# Patient Record
(demographics unavailable — no encounter records)

---

## 2025-02-19 NOTE — ADDENDUM
[FreeTextEntry1] : This note was written by Vivek Downs on 02/19/2025 acting as scribe for Dr. Jorge A Gomez M.D.  I, Jorge A Gomez MD, have read and attest that all the information, medical decision making and discharge instructions within are true and accurate.

## 2025-02-19 NOTE — HISTORY OF PRESENT ILLNESS
[Improving] : improving [de-identified] : 64 year-old male presents for follow up evaluation. LHD.  S/P C3-T1 fusion on 5/16/23 with Dr. Gentile. He had right hand weakness last visit, cannot fully make a fist. Has been exercising with a . 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  Overall, his right hand is getting better. He has seen a hand specialist and neurologists for this as well. He is currently undergoing hand therapy as well since July 2023, has not noticed much improvement.  His neck pain has improved.  He states he has been seeing a hand specialist Dr. Sidney Peralta at [facility].  No fever chills sweats nausea vomiting no bowel or bladder dysfunction, no recent weight loss or gain no night pain. This history is in addition to the intake form that I personally reviewed.

## 2025-02-19 NOTE — DISCUSSION/SUMMARY
[de-identified] : S/P C3-T1 fusion on 5/16/23 with Dr. Gentile. Working out. Doing better. Discussed all options. F/U 1 year. He was able to go skiing in [place].  He is also working with a . All options discussed including rest, medicine, home exercise, acupuncture, Chiropractic care, Physical Therapy, Pain management, and last resort surgery. All questions were answered, all alternatives discussed, and the patient is in complete agreement with the treatment plan which the patient contributed to and discussed with me through the shared decision-making process. Follow-up appointment as instructed. Any issues and the patient will call or come in sooner.

## 2025-02-19 NOTE — PHYSICAL EXAM
[Normal] : Gait: normal [Senior's Sign] : negative Senior's sign [Pronator Drift] : negative pronator drift [SLR] : negative straight leg raise [de-identified] : 3 out of 5 in finger extension of the middle and fourth as well as the fifth digit of the right hand.  The index and fourth digit are stronger than the fifth digit.  Overall better than prior visit.   Hand strength is improving. 5 out of 5 motor strength, sensation is intact and symmetrical full range of motion flexion extension and rotation, no palpatory tenderness full range of motion of hips knees shoulders and elbows (all four extremities), no atrophy, negative straight leg raise, no pathological reflexes, no swelling, normal ambulation, no apparent distress skin is intact, no palpable lymph nodes, no upper or lower extremity instability, alert and oriented x3 and normal mood. Normal finger-to nose test.   [de-identified] : XR AP Lat Cervical 02/19/2025 -C3-T1 PCF, adequate- reviewed with patient.

## 2025-04-02 NOTE — PHYSICAL EXAM
[FreeTextEntry1] : PE: Constitutional: In NAD, calm and cooperative MSK (Back)  Inspection: no gross swelling identified  Palpation: Tenderness of the bilateral lower lumbar paraspinals  ROM: Pain at end lumbar extension/flexion  Strength: 5/5 strength in bilateral lower extremities  Reflexes: 2+ Patella reflex bilaterally, 2+ Achilles reflex bilaterally, negative clonus bilaterally  Sensation: Intact to light touch in bilateral lower extremities Special tests: SLR: negative bilaterally TAMMY: negative bilaterally FADIR: negative bilaterally Facet loading: positive bilaterally

## 2025-04-02 NOTE — HISTORY OF PRESENT ILLNESS
[FreeTextEntry1] : Mr. 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   Location: Onset: Provocation/Palliative: Worse with activity, better with rest Quality: Radiation: Severity: Timing:   Denies any associated numbness. Denies any associated leg weakness. Denies any loss of bowel/bladder control or any groin numbness. Previous medications trialed: Previous procedures relevant to complaint: Hx of C3-T1 fusion Conservative therapy tried?: